# Patient Record
Sex: FEMALE | Race: WHITE | NOT HISPANIC OR LATINO | Employment: FULL TIME | RURAL
[De-identification: names, ages, dates, MRNs, and addresses within clinical notes are randomized per-mention and may not be internally consistent; named-entity substitution may affect disease eponyms.]

---
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
## 2024-01-19 ENCOUNTER — TELEPHONE (OUTPATIENT)
Dept: FAMILY MEDICINE CLINIC | Facility: CLINIC | Age: 19
End: 2024-01-19

## 2024-01-19 NOTE — TELEPHONE ENCOUNTER
Caller: Willam Govea    Relationship: Self    Best call back number: [phone]     What is the best time to reach you: ANY TIME TODAY SAT-WED 12-1    Who are you requesting to speak with (clinical staff, provider,  specific staff member): CLINICAL       What was the call regarding: WANTS TO KNOW ABOUT CERTIFYING HER DOG FOR SERVICE ANIMAL. PLEASE CALL TO LET HER KNOW STEPS. OK FOR VM     Is it okay if the provider responds through [employer]: NO

## 2024-01-19 NOTE — TELEPHONE ENCOUNTER
Called pt and let her know Dr. Ashby does not do this and it will need to come from her therapist, pt will call them about this.

## 2024-09-23 ENCOUNTER — OFFICE VISIT (OUTPATIENT)
Dept: OBSTETRICS AND GYNECOLOGY | Facility: CLINIC | Age: 19
End: 2024-09-23
Payer: COMMERCIAL

## 2024-09-23 VITALS
SYSTOLIC BLOOD PRESSURE: 92 MMHG | BODY MASS INDEX: 22.5 KG/M2 | DIASTOLIC BLOOD PRESSURE: 64 MMHG | HEIGHT: 63 IN | WEIGHT: 127 LBS

## 2024-09-23 DIAGNOSIS — R10.2 PELVIC PAIN: Primary | ICD-10-CM

## 2024-09-23 DIAGNOSIS — Z87.42 HISTORY OF OVARIAN CYST: ICD-10-CM

## 2024-09-23 PROCEDURE — 99203 OFFICE O/P NEW LOW 30 MIN: CPT | Performed by: OBSTETRICS & GYNECOLOGY

## 2024-09-23 RX ORDER — NORELGESTROMIN AND ETHINYL ESTRADIOL 35; 150 UG/MG; UG/MG
1 PATCH TRANSDERMAL WEEKLY
Qty: 3 PATCH | Refills: 12 | Status: SHIPPED | OUTPATIENT
Start: 2024-09-23 | End: 2025-09-23

## 2024-09-27 ENCOUNTER — PATIENT ROUNDING (BHMG ONLY) (OUTPATIENT)
Dept: OBSTETRICS AND GYNECOLOGY | Facility: CLINIC | Age: 19
End: 2024-09-27
Payer: COMMERCIAL

## 2024-10-07 ENCOUNTER — OFFICE VISIT (OUTPATIENT)
Dept: OBSTETRICS AND GYNECOLOGY | Facility: CLINIC | Age: 19
End: 2024-10-07
Payer: COMMERCIAL

## 2024-10-07 VITALS
BODY MASS INDEX: 23.74 KG/M2 | DIASTOLIC BLOOD PRESSURE: 68 MMHG | WEIGHT: 134 LBS | SYSTOLIC BLOOD PRESSURE: 98 MMHG | HEIGHT: 63 IN

## 2024-10-07 DIAGNOSIS — N83.202 BILATERAL OVARIAN CYSTS: ICD-10-CM

## 2024-10-07 DIAGNOSIS — R10.2 PELVIC PAIN: ICD-10-CM

## 2024-10-07 DIAGNOSIS — N83.201 BILATERAL OVARIAN CYSTS: ICD-10-CM

## 2024-10-07 DIAGNOSIS — Z11.3 SCREENING FOR STD (SEXUALLY TRANSMITTED DISEASE): Primary | ICD-10-CM

## 2024-10-07 NOTE — PROGRESS NOTES
Chief Complaint   Patient presents with    Gynecologic Exam    Follow-up       Subjective   HPI  Willam Govea is a 19 y.o. female, No obstetric history on file.. Her last LMP was Patient's last menstrual period was 09/14/2024 (exact date).. who presents for follow up on pelvic pain and ovarian cysts.      At her last visit she was treated with  Xulane . Since then she reports her symptoms/issue has remained unchanged. The patient reports additional symptoms as none.      US today with bilateral complex ovarian cysts. Explained findings and treatment options. She would like to proceed with dx laparoscopy for further evaluation.       Additional OB/GYN History     Last Pap : Never. Due to age  Last Completed Pap Smear       This patient has no relevant Health Maintenance data.            Last mammogram: N/A  Last Completed Mammogram       This patient has no relevant Health Maintenance data.            Tobacco Usage?: No   OB History    No obstetric history on file.           Current Outpatient Medications:     Levonorgestrel-Eth Estradiol (Twirla) 120-30 MCG/24HR patch weekly, Place  on the skin as directed by provider., Disp: , Rfl:     Levomefolate Glucosamine (MethylFolate) 400 MCG capsule, Take 1 capsule by mouth Daily. (Patient not taking: Reported on 9/23/2024), Disp: 90 capsule, Rfl: 1    norelgestromin-ethinyl estradiol (Xulane) 150-35 MCG/24HR, Place 1 patch on the skin as directed by provider 1 (One) Time Per Week. (Patient not taking: Reported on 10/7/2024), Disp: 3 patch, Rfl: 12     Past Medical History:   Diagnosis Date    ADHD (attention deficit hyperactivity disorder)     Allergic     Anxiety     Depression     Migraine 2022    Not for sure when    Ovarian cyst     PMS (premenstrual syndrome) When i was 10    Urinary tract infection July 2022    I think        Past Surgical History:   Procedure Laterality Date    MYRINGOTOMY W/ TUBES         The additional following portions of the  "patient's history were reviewed and updated as appropriate: allergies, current medications, past family history, past medical history, past social history, past surgical history, and problem list.    Review of Systems   Constitutional: Negative.    Respiratory: Negative.     Cardiovascular: Negative.    Gastrointestinal: Negative.    Genitourinary:  Positive for menstrual problem and pelvic pain. Negative for breast discharge, breast lump and breast pain.   Musculoskeletal: Negative.    Neurological: Negative.    Psychiatric/Behavioral: Negative.         I have reviewed and agree with the HPI, ROS, and historical information as entered above. Daniel Molina MD      Objective   BP 98/68   Ht 158.8 cm (62.52\")   Wt 60.8 kg (134 lb)   LMP 09/14/2024 (Exact Date)   BMI 24.10 kg/m²     Physical Exam  Vitals reviewed.   Constitutional:       Appearance: Normal appearance. She is normal weight.   HENT:      Head: Normocephalic and atraumatic.   Abdominal:      General: Abdomen is flat.   Skin:     General: Skin is warm and dry.   Neurological:      Mental Status: She is alert and oriented to person, place, and time.   Psychiatric:         Mood and Affect: Mood normal.         Behavior: Behavior normal.         Assessment & Plan     Assessment     Problem List Items Addressed This Visit       Pelvic pain    Bilateral ovarian cysts     Other Visit Diagnoses       Screening for STD (sexually transmitted disease)    -  Primary    Relevant Orders    Chlamydia trachomatis, Neisseria gonorrhoeae, Trichomonas vaginalis, PCR - Urine, Urine, Clean Catch            STD screening  Pelvic pain  Bilateral ovarian cysts    Plan     STD screening performed today. Will treat if necessary  Will proceed with dx laparoscopy for further evaluation of pelvic pain and bilateral ovarian cysts. Risks of surgery explained including bleeding, infection, damage to internal organs, need for additional surgery, and non-resolution of " symptoms.   Return in about 5 weeks (around 11/11/2024) for preoperative visit.        Daniel Molina MD  10/07/2024

## 2024-10-08 PROBLEM — N83.202 BILATERAL OVARIAN CYSTS: Status: ACTIVE | Noted: 2024-10-08

## 2024-10-08 PROBLEM — N83.201 BILATERAL OVARIAN CYSTS: Status: ACTIVE | Noted: 2024-10-08

## 2024-10-09 LAB
C TRACH RRNA SPEC QL NAA+PROBE: NEGATIVE
N GONORRHOEA RRNA SPEC QL NAA+PROBE: NEGATIVE
T VAGINALIS RRNA SPEC QL NAA+PROBE: NEGATIVE

## 2024-11-11 ENCOUNTER — OFFICE VISIT (OUTPATIENT)
Dept: OBSTETRICS AND GYNECOLOGY | Facility: CLINIC | Age: 19
End: 2024-11-11
Payer: COMMERCIAL

## 2024-11-11 VITALS
WEIGHT: 135 LBS | DIASTOLIC BLOOD PRESSURE: 70 MMHG | HEIGHT: 63 IN | SYSTOLIC BLOOD PRESSURE: 102 MMHG | BODY MASS INDEX: 23.92 KG/M2

## 2024-11-11 DIAGNOSIS — Z01.818 PRE-OP EXAM: Primary | ICD-10-CM

## 2024-11-11 DIAGNOSIS — R10.2 PELVIC PAIN: ICD-10-CM

## 2024-11-11 NOTE — PROGRESS NOTES
Gynecologic Preoperative Exam Note        Subjective   Willam Govea is a 19 y.o. year old No obstetric history on file. who is scheduled for diagnostic laparoscopy at [facility] on 11/14/2024 at 830.  Pre Admission testing has been scheduled for 11/11/2024at  [facility] . Her pre operative diagnosis is Chronic Pelvic Pain and Ovarian Cyst. She does not need to see her PCP for preop clearance for this surgery. No LMP recorded.. Her birth control method is OCP.  Her BMI is Body mass index is 24.28 kg/m²..      She has reviewed the information pertaining to diagnostic laparoscopy.    She has reviewed the informational pamphlet on diagnostic laparoscopy.    She understands the risks of bleeding, infection, possible damage to other organ systems, including but not limited to the gastrointestinal tract and genitourinary tract.  She also understands the specific risks listed in the preop information (video, pamphlets, etc.).    She has reviewed and signed the preop consent form.    Her code status is: FULL     She has been instructed to have a light dinner the night before surgery, then nothing to eat or drink after midnight.  The day of surgery do not chew gum or smoke.  Remove all jewelry, nail polish, contact lenses prior to coming to the hospital.  Do not bring valuables or large sums of money with you. Patient was instructed on what time to arrive and where to check in, maps were given.  She was instructed that she will meet an Anesthesiologist and that an IV will be started to provide fluids and sedation.  The total time of procedure was discussed.  She was instructed that she will need a .      Allergies   Allergen Reactions    Amoxicillin Rash     She has confirmed that she is not allergic to Latex.     She is on the following medications. These were reviewed with the patient today and instructed on which medications are ok to take with a sip of water prior to the surgery.      Current Outpatient Medications:      Levonorgestrel-Eth Estradiol (Twirla) 120-30 MCG/24HR patch weekly, Place  on the skin as directed by provider., Disp: , Rfl:     Levomefolate Glucosamine (MethylFolate) 400 MCG capsule, Take 1 capsule by mouth Daily. (Patient not taking: Reported on 9/23/2024), Disp: 90 capsule, Rfl: 1    norelgestromin-ethinyl estradiol (Xulane) 150-35 MCG/24HR, Place 1 patch on the skin as directed by provider 1 (One) Time Per Week. (Patient not taking: Reported on 10/7/2024), Disp: 3 patch, Rfl: 12     Past Medical History:   Diagnosis Date    ADHD (attention deficit hyperactivity disorder)     Allergic     Anxiety     Depression     Migraine 2022    Not for sure when    Ovarian cyst     PMS (premenstrual syndrome) When i was 10    Urinary tract infection July 2022    I think     Past Surgical History:   Procedure Laterality Date    MYRINGOTOMY W/ TUBES       OB History   No obstetric history on file.     Social History     Tobacco Use   Smoking Status Never   Smokeless Tobacco Never     Social History     Substance and Sexual Activity   Alcohol Use Never     Social History     Substance and Sexual Activity   Drug Use Never         Review of Systems   Constitutional: Negative.    Respiratory: Negative.     Cardiovascular: Negative.    Gastrointestinal: Negative.    Genitourinary:  Positive for pelvic pain. Negative for breast discharge, breast lump, breast pain and menstrual problem.   Musculoskeletal: Negative.    Neurological: Negative.    Psychiatric/Behavioral: Negative.        All other systems reviewed and negative.          Objective    Vitals:    11/11/24 0847   BP: 102/70         Physical Exam  Vitals reviewed.   Constitutional:       Appearance: Normal appearance. She is normal weight.   HENT:      Head: Normocephalic and atraumatic.   Cardiovascular:      Rate and Rhythm: Normal rate and regular rhythm.   Pulmonary:      Effort: Pulmonary effort is normal.   Abdominal:      General: Abdomen is flat. Bowel sounds are  normal.      Palpations: Abdomen is soft.   Skin:     General: Skin is warm and dry.   Neurological:      Mental Status: She is alert and oriented to person, place, and time.   Psychiatric:         Mood and Affect: Mood normal.         Behavior: Behavior normal.         Assessment   Problem List Items Addressed This Visit       Pelvic pain     Other Visit Diagnoses       Pre-op exam    -  Primary    Relevant Orders    Comprehensive Metabolic Panel    CBC & Differential    HCG, B-subunit, Quantitative                     Plan   Will proceed with diagnostic laparoscopy for further evaluation of pelvic pain at [facility] on Thursday.   Risks of surgery were reviewed with the patient including risks of bleeding, infection, damage to other organ systems including, but not limited to GI and  tracts (bowel, bladder, blood vessels, nerves) risks of Anesthesia, as well as the risk the surgery will not produce the desired results, possible need for additional surgery, death, risk of uterine perforation.  PAT Scheduled    Serg has been obtained and reviewed   Pain Medication Consent Form has been signed.  A review regarding proper medication administration, impact on driving and working while medicated, the safety of use in pregnancy, the potential for overdose and the proper disposal and storage of controlled medications has been done with the patient.          Daniel Molina MD  Visit Date: 11/11/2024

## 2024-11-12 LAB
ALBUMIN SERPL-MCNC: 4.5 G/DL (ref 3.5–5.2)
ALBUMIN/GLOB SERPL: 1.4 G/DL
ALP SERPL-CCNC: 86 U/L (ref 39–117)
ALT SERPL-CCNC: 18 U/L (ref 1–33)
AST SERPL-CCNC: 19 U/L (ref 1–32)
BASOPHILS # BLD AUTO: 0.04 10*3/MM3 (ref 0–0.2)
BASOPHILS NFR BLD AUTO: 0.7 % (ref 0–1.5)
BILIRUB SERPL-MCNC: <0.2 MG/DL (ref 0–1.2)
BUN SERPL-MCNC: 4 MG/DL (ref 6–20)
BUN/CREAT SERPL: 6.6 (ref 7–25)
CALCIUM SERPL-MCNC: 9.1 MG/DL (ref 8.6–10.5)
CHLORIDE SERPL-SCNC: 104 MMOL/L (ref 98–107)
CO2 SERPL-SCNC: 25.1 MMOL/L (ref 22–29)
CREAT SERPL-MCNC: 0.61 MG/DL (ref 0.57–1)
EGFRCR SERPLBLD CKD-EPI 2021: 131.4 ML/MIN/1.73
EOSINOPHIL # BLD AUTO: 0.23 10*3/MM3 (ref 0–0.4)
EOSINOPHIL NFR BLD AUTO: 4.3 % (ref 0.3–6.2)
ERYTHROCYTE [DISTWIDTH] IN BLOOD BY AUTOMATED COUNT: 11.7 % (ref 12.3–15.4)
GLOBULIN SER CALC-MCNC: 3.2 GM/DL
GLUCOSE SERPL-MCNC: 51 MG/DL (ref 65–99)
HCG INTACT+B SERPL-ACNC: <1 MIU/ML
HCT VFR BLD AUTO: 42.1 % (ref 34–46.6)
HGB BLD-MCNC: 13.6 G/DL (ref 12–15.9)
IMM GRANULOCYTES # BLD AUTO: 0.02 10*3/MM3 (ref 0–0.05)
IMM GRANULOCYTES NFR BLD AUTO: 0.4 % (ref 0–0.5)
LYMPHOCYTES # BLD AUTO: 1.49 10*3/MM3 (ref 0.7–3.1)
LYMPHOCYTES NFR BLD AUTO: 27.9 % (ref 19.6–45.3)
MCH RBC QN AUTO: 29.9 PG (ref 26.6–33)
MCHC RBC AUTO-ENTMCNC: 32.3 G/DL (ref 31.5–35.7)
MCV RBC AUTO: 92.5 FL (ref 79–97)
MONOCYTES # BLD AUTO: 0.39 10*3/MM3 (ref 0.1–0.9)
MONOCYTES NFR BLD AUTO: 7.3 % (ref 5–12)
NEUTROPHILS # BLD AUTO: 3.17 10*3/MM3 (ref 1.7–7)
NEUTROPHILS NFR BLD AUTO: 59.4 % (ref 42.7–76)
NRBC BLD AUTO-RTO: 0 /100 WBC (ref 0–0.2)
PLATELET # BLD AUTO: 318 10*3/MM3 (ref 140–450)
POTASSIUM SERPL-SCNC: 3.9 MMOL/L (ref 3.5–5.2)
PROT SERPL-MCNC: 7.7 G/DL (ref 6–8.5)
RBC # BLD AUTO: 4.55 10*6/MM3 (ref 3.77–5.28)
SODIUM SERPL-SCNC: 141 MMOL/L (ref 136–145)
WBC # BLD AUTO: 5.34 10*3/MM3 (ref 3.4–10.8)

## 2024-11-14 ENCOUNTER — OUTSIDE FACILITY SERVICE (OUTPATIENT)
Dept: OBSTETRICS AND GYNECOLOGY | Facility: CLINIC | Age: 19
End: 2024-11-14
Payer: COMMERCIAL

## 2024-11-14 ENCOUNTER — LAB REQUISITION (OUTPATIENT)
Dept: LAB | Facility: HOSPITAL | Age: 19
End: 2024-11-14
Payer: COMMERCIAL

## 2024-11-14 DIAGNOSIS — R10.2 PELVIC AND PERINEAL PAIN: ICD-10-CM

## 2024-11-14 DIAGNOSIS — Z98.890 STATUS POST LAPAROSCOPY: Primary | ICD-10-CM

## 2024-11-14 PROCEDURE — 88305 TISSUE EXAM BY PATHOLOGIST: CPT | Performed by: OBSTETRICS & GYNECOLOGY

## 2024-11-14 RX ORDER — IBUPROFEN 600 MG/1
600 TABLET, FILM COATED ORAL EVERY 6 HOURS PRN
Qty: 30 TABLET | Refills: 3 | Status: SHIPPED | OUTPATIENT
Start: 2024-11-14

## 2024-11-14 RX ORDER — OXYCODONE AND ACETAMINOPHEN 5; 325 MG/1; MG/1
1 TABLET ORAL EVERY 8 HOURS PRN
Qty: 12 TABLET | Refills: 0 | Status: SHIPPED | OUTPATIENT
Start: 2024-11-14

## 2024-11-14 RX ORDER — PROMETHAZINE HYDROCHLORIDE 12.5 MG/1
12.5 TABLET ORAL EVERY 6 HOURS PRN
Qty: 12 TABLET | Refills: 0 | Status: SHIPPED | OUTPATIENT
Start: 2024-11-14

## 2024-11-15 LAB
CYTO UR: NORMAL
LAB AP CASE REPORT: NORMAL
LAB AP CLINICAL INFORMATION: NORMAL
PATH REPORT.FINAL DX SPEC: NORMAL
PATH REPORT.GROSS SPEC: NORMAL

## 2024-12-02 ENCOUNTER — OFFICE VISIT (OUTPATIENT)
Dept: OBSTETRICS AND GYNECOLOGY | Facility: CLINIC | Age: 19
End: 2024-12-02
Payer: COMMERCIAL

## 2024-12-02 VITALS
HEIGHT: 63 IN | WEIGHT: 134 LBS | SYSTOLIC BLOOD PRESSURE: 100 MMHG | BODY MASS INDEX: 23.74 KG/M2 | DIASTOLIC BLOOD PRESSURE: 62 MMHG

## 2024-12-02 DIAGNOSIS — Z09 POSTOPERATIVE EXAMINATION: ICD-10-CM

## 2024-12-02 DIAGNOSIS — N80.9 ENDOMETRIOSIS: Primary | ICD-10-CM

## 2024-12-02 PROCEDURE — 99024 POSTOP FOLLOW-UP VISIT: CPT | Performed by: OBSTETRICS & GYNECOLOGY

## 2024-12-02 NOTE — PROGRESS NOTES
OBGYN Postoperative Exam Note          Subjective   Chief Complaint   Patient presents with    Post-op Follow-up     Willam Govea is a 19 y.o. year old No obstetric history on file. presenting to be seen for her post-operative visit. She is S/P laparoscopy on 11/14/2024 at [facility] for  pelvic pain . Currently she reports no problems with eating, bowel movements, voiding, or wound drainage and pain is well controlled.    LEFT OVARY CYSTECTOMY:  Benign cyst with associated hemorrhage, hemosiderin deposition and focal stroma compatible with endometriotic cyst with no atypia identified. Discussed diagnosis of endometriosis along with treatment options    OTHER THINGS SHE WANTS TO DISCUSS TODAY:  Nothing else      Current Outpatient Medications:     ibuprofen (ADVIL,MOTRIN) 600 MG tablet, Take 1 tablet by mouth Every 6 (Six) Hours As Needed for Mild Pain., Disp: 30 tablet, Rfl: 3    norelgestromin-ethinyl estradiol (Xulane) 150-35 MCG/24HR, Place 1 patch on the skin as directed by provider 1 (One) Time Per Week., Disp: 3 patch, Rfl: 12    Levomefolate Glucosamine (MethylFolate) 400 MCG capsule, Take 1 capsule by mouth Daily. (Patient not taking: Reported on 9/23/2024), Disp: 90 capsule, Rfl: 1    Levonorgestrel-Eth Estradiol (Twirla) 120-30 MCG/24HR patch weekly, Place  on the skin as directed by provider., Disp: , Rfl:     oxyCODONE-acetaminophen (Percocet) 5-325 MG per tablet, Take 1 tablet by mouth Every 8 (Eight) Hours As Needed for Moderate Pain., Disp: 12 tablet, Rfl: 0    promethazine (PHENERGAN) 12.5 MG tablet, Take 1 tablet by mouth Every 6 (Six) Hours As Needed for Nausea., Disp: 12 tablet, Rfl: 0     Past Medical History:   Diagnosis Date    ADHD (attention deficit hyperactivity disorder)     Allergic     Anxiety     Depression     Migraine 2022    Not for sure when    Ovarian cyst     PMS (premenstrual syndrome) When i was 10    Urinary tract infection July 2022    I think        Past Surgical History:  "  Procedure Laterality Date    MYRINGOTOMY W/ TUBES         The following portions of the patient's history were reviewed and updated as appropriate:current medications and allergies    Review of Systems   Constitutional: Negative.    Respiratory: Negative.     Gastrointestinal: Negative.    Genitourinary: Negative.    Musculoskeletal: Negative.    Neurological: Negative.    Psychiatric/Behavioral: Negative.            Objective   /62   Ht 158.8 cm (62.52\")   Wt 60.8 kg (134 lb)   BMI 24.10 kg/m²     Physical Exam  Vitals reviewed.   Constitutional:       Appearance: Normal appearance. She is normal weight.   HENT:      Head: Normocephalic and atraumatic.   Abdominal:      General: Abdomen is flat.      Palpations: Abdomen is soft.   Skin:     General: Skin is warm and dry.   Neurological:      Mental Status: She is alert and oriented to person, place, and time.   Psychiatric:         Mood and Affect: Mood normal.         Behavior: Behavior normal.              Assessment   S/P laparoscopy     Plan   May return to full activity with no restrictions  Pathology was reviewed with patient and Any significant events that occurred during surgery reviewed  The importance of keeping all planned follow-up and taking all medications as prescribed was emphasized.  Return in about 3 months (around 3/2/2025) for GYN visit.              Daniel Molina MD  12/02/2024     "

## 2025-03-03 ENCOUNTER — OFFICE VISIT (OUTPATIENT)
Dept: OBSTETRICS AND GYNECOLOGY | Facility: CLINIC | Age: 20
End: 2025-03-03
Payer: COMMERCIAL

## 2025-03-03 VITALS
DIASTOLIC BLOOD PRESSURE: 80 MMHG | HEIGHT: 63 IN | BODY MASS INDEX: 26.4 KG/M2 | WEIGHT: 149 LBS | SYSTOLIC BLOOD PRESSURE: 110 MMHG

## 2025-03-03 DIAGNOSIS — N80.9 ENDOMETRIOSIS: ICD-10-CM

## 2025-03-03 DIAGNOSIS — Z30.45 ENCOUNTER FOR SURVEILLANCE OF TRANSDERMAL PATCH HORMONAL CONTRACEPTIVE DEVICE: Primary | ICD-10-CM

## 2025-03-03 RX ORDER — CETIRIZINE HYDROCHLORIDE 5 MG/1
5 TABLET ORAL DAILY
COMMUNITY

## 2025-03-03 NOTE — PROGRESS NOTES
Chief Complaint   Patient presents with    Contraception       Subjective   HPI  Wilalm Govea is a 19 y.o. female, No obstetric history on file.. Her last LMP was Patient's last menstrual period was 03/03/2025.. who presents for follow up on birth control.      At her last visit she was treated with birth control patch. Since then she reports her symptoms/issue has weight gain of 25 pounds. The patient reports additional symptoms as none.        Additional OB/GYN History     Last Pap :   Last Completed Pap Smear       This patient has no relevant Health Maintenance data.            Last mammogram:   Last Completed Mammogram       This patient has no relevant Health Maintenance data.            Tobacco Usage?: No   OB History    No obstetric history on file.           Current Outpatient Medications:     cetirizine (zyrTEC) 5 MG tablet, Take 1 tablet by mouth Daily., Disp: , Rfl:     Levonorgestrel-Eth Estradiol (Twirla) 120-30 MCG/24HR patch weekly, Place  on the skin as directed by provider., Disp: , Rfl:     norelgestromin-ethinyl estradiol (Xulane) 150-35 MCG/24HR, Place 1 patch on the skin as directed by provider 1 (One) Time Per Week., Disp: 3 patch, Rfl: 12    ibuprofen (ADVIL,MOTRIN) 600 MG tablet, Take 1 tablet by mouth Every 6 (Six) Hours As Needed for Mild Pain., Disp: 30 tablet, Rfl: 3    Levomefolate Glucosamine (MethylFolate) 400 MCG capsule, Take 1 capsule by mouth Daily. (Patient not taking: Reported on 9/23/2024), Disp: 90 capsule, Rfl: 1    oxyCODONE-acetaminophen (Percocet) 5-325 MG per tablet, Take 1 tablet by mouth Every 8 (Eight) Hours As Needed for Moderate Pain., Disp: 12 tablet, Rfl: 0    promethazine (PHENERGAN) 12.5 MG tablet, Take 1 tablet by mouth Every 6 (Six) Hours As Needed for Nausea., Disp: 12 tablet, Rfl: 0     Past Medical History:   Diagnosis Date    ADHD (attention deficit hyperactivity disorder)     Allergic     Anxiety     Depression     Migraine 2022    Not for sure  "when    Ovarian cyst     PMS (premenstrual syndrome) When i was 10    Urinary tract infection July 2022    I think        Past Surgical History:   Procedure Laterality Date    MYRINGOTOMY W/ TUBES         The additional following portions of the patient's history were reviewed and updated as appropriate: allergies and current medications.    Review of Systems   Constitutional:  Positive for unexpected weight gain. Negative for activity change, appetite change and unexpected weight loss.   Respiratory: Negative.     Cardiovascular: Negative.    Gastrointestinal: Negative.    Genitourinary: Negative.    Musculoskeletal: Negative.    Neurological: Negative.    Psychiatric/Behavioral: Negative.         I have reviewed and agree with the HPI, ROS, and historical information as entered above. Daniel Molina MD      Objective   /80   Ht 158.8 cm (62.52\")   Wt 67.6 kg (149 lb)   LMP 03/03/2025   BMI 26.80 kg/m²     Physical Exam  Vitals reviewed.   Constitutional:       Appearance: Normal appearance. She is normal weight.   HENT:      Head: Normocephalic and atraumatic.   Abdominal:      General: Abdomen is flat.      Palpations: Abdomen is soft.   Skin:     General: Skin is warm and dry.   Neurological:      Mental Status: She is alert and oriented to person, place, and time.   Psychiatric:         Mood and Affect: Mood normal.         Behavior: Behavior normal.         Assessment & Plan     Assessment     Problem List Items Addressed This Visit       Endometriosis     Other Visit Diagnoses       Encounter for surveillance of transdermal patch hormonal contraceptive device    -  Primary            Endometriosis  Encounter for surveillance of transdermal patch    Plan     Despite the weight gain, she is doing well with birth control patch with limited pelvic pain and states she's happy with it. Discussed weight loss goals and will start going to the gym.   Will f/u in 6 months for annual gynecologic exam " and pap smear  Return in about 6 months (around 9/3/2025) for Annual physical.        Daniel Molina MD  03/03/2025

## 2025-04-14 ENCOUNTER — TELEPHONE (OUTPATIENT)
Dept: OBSTETRICS AND GYNECOLOGY | Facility: CLINIC | Age: 20
End: 2025-04-14

## 2025-04-14 ENCOUNTER — TELEPHONE (OUTPATIENT)
Dept: OBSTETRICS AND GYNECOLOGY | Facility: CLINIC | Age: 20
End: 2025-04-14
Payer: COMMERCIAL

## 2025-04-14 NOTE — TELEPHONE ENCOUNTER
DARREN HAYNES    [phone]    PT IS NEEDING A PRIOR AUTH FOR LOESTRIN SENT TO [facility].

## 2025-04-14 NOTE — TELEPHONE ENCOUNTER
Provider: DR VILLA    Caller: Willam Govea    Relationship to Patient: Self    Phone Number: [phone]    Reason for Call: PT CALLED TO CHECK STATUS ON PA; I ADVISED PT SOMEONE WOULD CALL HER TOMORROW WITH AN UPDATE;     PLEASE CALL PT TOMORROW.

## 2025-04-14 NOTE — TELEPHONE ENCOUNTER
[facility] [id number] [facility] [id number] [id number] ID [id number]  [facility] [phone]. I have called this group and they have to fax a form to fill out.

## 2025-04-16 ENCOUNTER — TELEPHONE (OUTPATIENT)
Dept: OBSTETRICS AND GYNECOLOGY | Facility: CLINIC | Age: 20
End: 2025-04-16
Payer: COMMERCIAL

## 2025-04-16 NOTE — TELEPHONE ENCOUNTER
Update on PA for Abbie Guoin. PA approved by [employer]. Cost $25 for 1 month and she can only get one month at a time. She can call to see where she can get 90 day supply and it may be $25 also. Pt verb understanding.

## 2025-05-19 ENCOUNTER — OFFICE VISIT (OUTPATIENT)
Dept: FAMILY MEDICINE CLINIC | Facility: CLINIC | Age: 20
End: 2025-05-19
Payer: COMMERCIAL

## 2025-05-19 VITALS
HEIGHT: 63 IN | OXYGEN SATURATION: 100 % | WEIGHT: 152 LBS | HEART RATE: 77 BPM | SYSTOLIC BLOOD PRESSURE: 96 MMHG | BODY MASS INDEX: 26.93 KG/M2 | DIASTOLIC BLOOD PRESSURE: 60 MMHG

## 2025-05-19 DIAGNOSIS — Z15.89 MTHFR GENE MUTATION: ICD-10-CM

## 2025-05-19 DIAGNOSIS — Z82.61 FAMILY HISTORY OF RHEUMATOID ARTHRITIS: Primary | ICD-10-CM

## 2025-05-19 DIAGNOSIS — Z13.220 LIPID SCREENING: ICD-10-CM

## 2025-05-19 DIAGNOSIS — R55 NEAR SYNCOPE: ICD-10-CM

## 2025-05-19 DIAGNOSIS — N80.9 ENDOMETRIOSIS: ICD-10-CM

## 2025-05-19 DIAGNOSIS — E16.2 LOW BLOOD GLUCOSE MEASUREMENT: ICD-10-CM

## 2025-05-19 DIAGNOSIS — E61.1 IRON DEFICIENCY: ICD-10-CM

## 2025-05-19 DIAGNOSIS — E55.9 VITAMIN D DEFICIENCY: ICD-10-CM

## 2025-05-19 DIAGNOSIS — F41.1 GENERALIZED ANXIETY DISORDER: ICD-10-CM

## 2025-05-19 DIAGNOSIS — E53.8 B12 DEFICIENCY: ICD-10-CM

## 2025-05-19 PROCEDURE — 99214 OFFICE O/P EST MOD 30 MIN: CPT | Performed by: STUDENT IN AN ORGANIZED HEALTH CARE EDUCATION/TRAINING PROGRAM

## 2025-05-19 NOTE — PROGRESS NOTES
"Chief Complaint  Annual Exam and multiple complaints (Low blood sugar, bump on her leg, weight gain, 30 lb in 1 month, back pain, migraines, possible raynauds, maybe bipolar. Asthma.)    Subjective          Willam Govea presents to [facility]  History of Present Illness    Patient is here for several complaints.     She states that she has been having trouble with weight gain in the past few months. She states that she has had a 20-30 lb weight gain.     She states that she occasionally gets weak, dizzy, lightheaded and shakey. She states that it was worse when she was younger, but over the past month she states that it has worsened since her weight has gone up. She states that she has been counting calories and she has not had any of these episodes since starting these. She states that she normally has to get 40 to 80 ounces of water a day and she states that she will drink some water and will eat something and the symptoms get better. She has never had any syncope or near syncope.     She is concerned that she could have possible raynaud's as well since her mother has this as well as several other rheumatologic disorders and connective tissue disorders.     Objective   Vital Signs:   BP 96/60   Pulse 77   Ht 158.8 cm (62.5\")   Wt 68.9 kg (152 lb)   SpO2 100%   BMI 27.36 kg/m²     Body mass index is 27.36 kg/m².    Review of Systems    Past History:  Medical History: has a past medical history of ADHD (attention deficit hyperactivity disorder), Allergic, Anxiety, Asthma, Depression, Migraine (2022), Ovarian cyst, PMS (premenstrual syndrome) (When i was 10), and Urinary tract infection (July 2022).   Surgical History: has a past surgical history that includes Myringotomy w/ tubes and Laparoscopic ovarian cystectomy (Left, 11/15/2024).   Family History: family history includes Anxiety disorder in her mother and sister; Depression in her mother; Diabetes in her maternal grandfather, " maternal grandmother, and mother; Miscarriages / Stillbirths in her mother; Other in her mother; Ovarian cancer in her maternal grandmother; Thyroid disease in her mother.   Social History: reports that she has never smoked. She has never used smokeless tobacco. She reports that she does not currently use alcohol after a past usage of about 5.0 standard drinks of alcohol per week. She reports that she does not use drugs.      Current Outpatient Medications:     cetirizine (zyrTEC) 5 MG tablet, Take 1 tablet by mouth Daily., Disp: , Rfl:     Levomefolate Glucosamine (MethylFolate) 400 MCG capsule, Take 1 capsule by mouth Daily. (Patient not taking: Reported on 9/23/2024), Disp: 90 capsule, Rfl: 1    Norethin-Eth Estrad-Fe Biphas (LO LOESTRIN FE) 1 MG-10 MCG / 10 MCG tablet, Take 1 tablet by mouth Daily., Disp: 84 tablet, Rfl: 4    Allergies: Amoxicillin    Physical Exam  Constitutional:       General: She is not in acute distress.     Appearance: She is not ill-appearing or toxic-appearing.   HENT:      Head: Normocephalic and atraumatic.   Cardiovascular:      Rate and Rhythm: Normal rate and regular rhythm.      Heart sounds: No murmur heard.  Pulmonary:      Effort: Pulmonary effort is normal. No respiratory distress.   Neurological:      General: No focal deficit present.      Mental Status: She is alert and oriented to person, place, and time.   Psychiatric:         Mood and Affect: Mood normal.         Thought Content: Thought content normal.        Result Review :                   Assessment and Plan    Diagnoses and all orders for this visit:    1. Family history of rheumatoid arthritis (Primary)  -     LAKHWINDER  -     Sedimentation Rate  -     C-reactive Protein    2. Generalized anxiety disorder    3. MTHFR gene mutation    4. Endometriosis  -     TSH  -     T4, Free    5. Vitamin D deficiency  -     Vitamin D,25-Hydroxy    6. Iron deficiency  -     CBC & Differential  -     Iron and TIBC  -     Ferritin  -      Vitamin B12 & Folate    7. B12 deficiency  -     CBC & Differential  -     Vitamin B12 & Folate    8. Near syncope  -     Comprehensive Metabolic Panel  -     CBC & Differential    9. Lipid screening  -     Lipid Panel    10. Low blood glucose measurement  -     Hemoglobin A1c    Blood work ordered and will contact with results when available. Will adjust medications based on abnormalities seen.     Will do evaluation of rheumatologic problems as well as possible anemia and vitamin deficiencies.     Will contact patient with results when available and discuss next steps.     Past medical and surgical history as well as allergies, family history and social history were reviewed, and discussed with patient.  Chronic conditions were reviewed as well as medications.   Anticipatory guidance handouts including healthy diet, health maintenance, as well as regular exercise and general instructions were given via [employer], and patient was able to ask questions and discuss any concerns.        Follow Up   No follow-ups on file.  Patient was given instructions and counseling regarding her condition or for health maintenance advice. Please see specific information pulled into the AVS if appropriate.     Magnolia Ashby, DO

## 2025-05-20 LAB
25(OH)D3+25(OH)D2 SERPL-MCNC: 45.1 NG/ML (ref 30–100)
ALBUMIN SERPL-MCNC: 4.6 G/DL (ref 4–5)
ALP SERPL-CCNC: 98 IU/L (ref 42–106)
ALT SERPL-CCNC: 22 IU/L (ref 0–32)
ANA SER QL: NEGATIVE
AST SERPL-CCNC: 20 IU/L (ref 0–40)
BASOPHILS # BLD AUTO: 0.1 X10E3/UL (ref 0–0.2)
BASOPHILS NFR BLD AUTO: 1 %
BILIRUB SERPL-MCNC: <0.2 MG/DL (ref 0–1.2)
BUN SERPL-MCNC: 8 MG/DL (ref 6–20)
BUN/CREAT SERPL: 14 (ref 9–23)
CALCIUM SERPL-MCNC: 9.5 MG/DL (ref 8.7–10.2)
CHLORIDE SERPL-SCNC: 101 MMOL/L (ref 96–106)
CHOLEST SERPL-MCNC: 191 MG/DL (ref 100–199)
CO2 SERPL-SCNC: 22 MMOL/L (ref 20–29)
CREAT SERPL-MCNC: 0.59 MG/DL (ref 0.57–1)
CRP SERPL-MCNC: 14 MG/L (ref 0–10)
EGFRCR SERPLBLD CKD-EPI 2021: 132 ML/MIN/1.73
EOSINOPHIL # BLD AUTO: 0.2 X10E3/UL (ref 0–0.4)
EOSINOPHIL NFR BLD AUTO: 3 %
ERYTHROCYTE [DISTWIDTH] IN BLOOD BY AUTOMATED COUNT: 12.1 % (ref 11.7–15.4)
ERYTHROCYTE [SEDIMENTATION RATE] IN BLOOD BY WESTERGREN METHOD: 24 MM/HR (ref 0–32)
FERRITIN SERPL-MCNC: 30 NG/ML (ref 15–150)
FOLATE SERPL-MCNC: >20 NG/ML
GLOBULIN SER CALC-MCNC: 3 G/DL (ref 1.5–4.5)
GLUCOSE SERPL-MCNC: 80 MG/DL (ref 70–99)
HBA1C MFR BLD: 4.9 % (ref 4.8–5.6)
HCT VFR BLD AUTO: 40.5 % (ref 34–46.6)
HDLC SERPL-MCNC: 96 MG/DL
HGB BLD-MCNC: 13.1 G/DL (ref 11.1–15.9)
IMM GRANULOCYTES # BLD AUTO: 0 X10E3/UL (ref 0–0.1)
IMM GRANULOCYTES NFR BLD AUTO: 0 %
IRON SATN MFR SERPL: 15 % (ref 15–55)
IRON SERPL-MCNC: 74 UG/DL (ref 27–159)
LDLC SERPL CALC-MCNC: 81 MG/DL (ref 0–99)
LYMPHOCYTES # BLD AUTO: 2.2 X10E3/UL (ref 0.7–3.1)
LYMPHOCYTES NFR BLD AUTO: 25 %
MCH RBC QN AUTO: 30.3 PG (ref 26.6–33)
MCHC RBC AUTO-ENTMCNC: 32.3 G/DL (ref 31.5–35.7)
MCV RBC AUTO: 94 FL (ref 79–97)
MONOCYTES # BLD AUTO: 0.5 X10E3/UL (ref 0.1–0.9)
MONOCYTES NFR BLD AUTO: 6 %
NEUTROPHILS # BLD AUTO: 5.6 X10E3/UL (ref 1.4–7)
NEUTROPHILS NFR BLD AUTO: 65 %
PLATELET # BLD AUTO: 373 X10E3/UL (ref 150–450)
POTASSIUM SERPL-SCNC: 4.1 MMOL/L (ref 3.5–5.2)
PROT SERPL-MCNC: 7.6 G/DL (ref 6–8.5)
RBC # BLD AUTO: 4.32 X10E6/UL (ref 3.77–5.28)
SODIUM SERPL-SCNC: 138 MMOL/L (ref 134–144)
T4 FREE SERPL-MCNC: 0.91 NG/DL (ref 0.82–1.77)
TIBC SERPL-MCNC: 495 UG/DL (ref 250–450)
TRIGL SERPL-MCNC: 78 MG/DL (ref 0–149)
TSH SERPL DL<=0.005 MIU/L-ACNC: 1.61 UIU/ML (ref 0.45–4.5)
UIBC SERPL-MCNC: 421 UG/DL (ref 131–425)
VIT B12 SERPL-MCNC: 279 PG/ML (ref 232–1245)
VLDLC SERPL CALC-MCNC: 14 MG/DL (ref 5–40)
WBC # BLD AUTO: 8.6 X10E3/UL (ref 3.4–10.8)

## 2025-07-07 ENCOUNTER — OFFICE VISIT (OUTPATIENT)
Dept: OBSTETRICS AND GYNECOLOGY | Facility: CLINIC | Age: 20
End: 2025-07-07
Payer: COMMERCIAL

## 2025-07-07 VITALS
WEIGHT: 150.2 LBS | HEIGHT: 63 IN | DIASTOLIC BLOOD PRESSURE: 80 MMHG | BODY MASS INDEX: 26.61 KG/M2 | SYSTOLIC BLOOD PRESSURE: 100 MMHG

## 2025-07-07 DIAGNOSIS — N83.201 BILATERAL OVARIAN CYSTS: Primary | ICD-10-CM

## 2025-07-07 DIAGNOSIS — N83.202 BILATERAL OVARIAN CYSTS: Primary | ICD-10-CM

## 2025-07-07 DIAGNOSIS — N80.9 ENDOMETRIOSIS: ICD-10-CM

## 2025-07-07 DIAGNOSIS — R10.2 PELVIC PAIN: ICD-10-CM

## 2025-07-07 DIAGNOSIS — G43.109 MIGRAINE WITH AURA AND WITHOUT STATUS MIGRAINOSUS, NOT INTRACTABLE: ICD-10-CM

## 2025-07-07 RX ORDER — ACETAMINOPHEN AND CODEINE PHOSPHATE 120; 12 MG/5ML; MG/5ML
1 SOLUTION ORAL DAILY
Qty: 28 TABLET | Refills: 12 | Status: SHIPPED | OUTPATIENT
Start: 2025-07-07 | End: 2026-07-07

## 2025-07-07 NOTE — PROGRESS NOTES
Chief Complaint   Patient presents with    Gynecologic Exam    Follow-up       Subjective   HPI  Willam Govea is a 20 y.o. female, No obstetric history on file.Her last LMP was Patient's last menstrual period was 06/29/2025 (exact date). Who presents for follow up on pelvic pain , endometriosis.      US done today. Reviewed findings and all questions answered.     At her last visit she was treated with expectant management. Patient states when she is on her period she is having sharp stabbing RLQ pain. She is also having the same pain on the left side as well.     She is having worsening headaches and now with migraine with aura.. Discussed risk of stroke with OCPs and will stop and switch to progestin only form.       Additional OB/GYN History     Last Pap : none  Last Completed Pap Smear    This patient has no relevant Health Maintenance data.         Last mammogram: N/A  Last Completed Mammogram    This patient has no relevant Health Maintenance data.         Tobacco Usage?: No   OB History    No obstetric history on file.           Current Outpatient Medications:     cetirizine (zyrTEC) 5 MG tablet, Take 1 tablet by mouth Daily., Disp: , Rfl:     glucose monitor monitoring kit, Use 1 each Daily. Please dispense insurance preferred with Test strips and lancets to test once daily #90 with 1 refills. Dx: Hypoglycemia, Disp: 1 each, Rfl: 0    Levomefolate Glucosamine (MethylFolate) 400 MCG capsule, Take 1 capsule by mouth Daily. (Patient not taking: Reported on 7/7/2025), Disp: 90 capsule, Rfl: 1    norethindrone (MICRONOR) 0.35 MG tablet, Take 1 tablet by mouth Daily., Disp: 28 tablet, Rfl: 12     Past Medical History:   Diagnosis Date    ADHD (attention deficit hyperactivity disorder)     Allergic     Anxiety     Asthma     Depression     Migraine 2022    Not for sure when    Ovarian cyst     PMS (premenstrual syndrome) When i was 10    Urinary tract infection July 2022    I think        Past Surgical  "History:   Procedure Laterality Date    LAPAROSCOPIC OVARIAN CYSTECTOMY Left 11/15/2024    MYRINGOTOMY W/ TUBES         The additional following portions of the patient's history were reviewed and updated as appropriate: allergies, current medications, past family history, past medical history, past social history, past surgical history, and problem list.    Review of Systems   Constitutional: Negative.    Respiratory: Negative.     Cardiovascular: Negative.    Gastrointestinal: Negative.    Genitourinary: Negative.    Musculoskeletal: Negative.    Neurological:  Positive for headache.   Psychiatric/Behavioral: Negative.         I have reviewed and agree with the HPI, ROS, and historical information as entered above. Daniel Molina MD      Objective   /80   Ht 158.8 cm (62.52\")   Wt 68.1 kg (150 lb 3.2 oz)   LMP 06/29/2025 (Exact Date)   BMI 27.02 kg/m²     Physical Exam  Vitals reviewed.   Constitutional:       Appearance: Normal appearance.   HENT:      Head: Normocephalic and atraumatic.   Abdominal:      General: Abdomen is flat.      Palpations: Abdomen is soft.   Skin:     General: Skin is warm and dry.   Neurological:      Mental Status: She is alert and oriented to person, place, and time.   Psychiatric:         Mood and Affect: Mood normal.         Behavior: Behavior normal.         Assessment & Plan     Assessment     Problem List Items Addressed This Visit       Pelvic pain    Bilateral ovarian cysts - Primary    Endometriosis    Migraine with aura and without status migrainosus, not intractable       Pelvic pain  Ovarian cysts  Endometriosisi  Migraine with aura    Plan     Will continue hormonal management and will switch to progestin only form due to migraine with aura.   Return for keep follow up in September for annual exam.        Daniel Molina MD  07/07/2025   "

## 2025-07-08 PROBLEM — G43.109 MIGRAINE WITH AURA AND WITHOUT STATUS MIGRAINOSUS, NOT INTRACTABLE: Status: ACTIVE | Noted: 2025-07-08

## 2025-08-28 ENCOUNTER — PATIENT MESSAGE (OUTPATIENT)
Dept: FAMILY MEDICINE CLINIC | Facility: CLINIC | Age: 20
End: 2025-08-28
Payer: COMMERCIAL